# Patient Record
Sex: MALE | Race: WHITE | NOT HISPANIC OR LATINO | Employment: OTHER | URBAN - METROPOLITAN AREA
[De-identification: names, ages, dates, MRNs, and addresses within clinical notes are randomized per-mention and may not be internally consistent; named-entity substitution may affect disease eponyms.]

---

## 2018-02-26 RX ORDER — CYCLOBENZAPRINE HCL 10 MG
10 TABLET ORAL 3 TIMES DAILY PRN
COMMUNITY
End: 2022-12-02

## 2018-02-26 RX ORDER — METHOCARBAMOL 750 MG/1
750 TABLET, FILM COATED ORAL 4 TIMES DAILY PRN
COMMUNITY
End: 2022-12-02

## 2018-02-26 RX ORDER — GABAPENTIN 300 MG/1
300 CAPSULE ORAL 3 TIMES DAILY
COMMUNITY
End: 2021-06-07

## 2018-02-26 RX ORDER — SILDENAFIL CITRATE 20 MG/1
20 TABLET ORAL DAILY
COMMUNITY
End: 2022-12-02

## 2018-02-26 RX ORDER — LORATADINE 10 MG/1
10 TABLET ORAL DAILY
COMMUNITY

## 2018-02-26 RX ORDER — OFLOXACIN 3 MG/ML
1 SOLUTION/ DROPS OPHTHALMIC 4 TIMES DAILY
COMMUNITY
End: 2021-06-07

## 2018-02-26 RX ORDER — FLUTICASONE PROPIONATE 50 MCG
2 SPRAY, SUSPENSION (ML) NASAL DAILY
COMMUNITY
End: 2022-12-02

## 2018-02-26 RX ORDER — RANITIDINE 150 MG/1
150 TABLET ORAL 2 TIMES DAILY
COMMUNITY
End: 2021-06-07

## 2018-02-26 RX ORDER — DICLOFENAC SODIUM 75 MG/1
75 TABLET, DELAYED RELEASE ORAL 2 TIMES DAILY
COMMUNITY
End: 2021-06-07

## 2018-02-26 RX ORDER — MELOXICAM 15 MG/1
15 TABLET ORAL DAILY
COMMUNITY
End: 2022-12-02 | Stop reason: SDUPTHER

## 2018-02-26 RX ORDER — ASPIRIN 81 MG/1
81 TABLET ORAL DAILY
COMMUNITY
End: 2023-02-06

## 2018-02-27 ENCOUNTER — OFFICE VISIT (OUTPATIENT)
Dept: NEUROSURGERY | Facility: CLINIC | Age: 68
End: 2018-02-27

## 2018-02-27 VITALS
RESPIRATION RATE: 18 BRPM | TEMPERATURE: 96.8 F | HEIGHT: 73 IN | WEIGHT: 218 LBS | BODY MASS INDEX: 28.89 KG/M2 | OXYGEN SATURATION: 99 %

## 2018-02-27 DIAGNOSIS — M47.816 FACET ARTHRITIS OF LUMBAR REGION: ICD-10-CM

## 2018-02-27 DIAGNOSIS — M54.9 MECHANICAL BACK PAIN: Primary | ICD-10-CM

## 2018-02-27 DIAGNOSIS — M51.36 DEGENERATIVE DISC DISEASE, LUMBAR: ICD-10-CM

## 2018-02-27 DIAGNOSIS — M51.36 BULGING LUMBAR DISC: ICD-10-CM

## 2018-02-27 PROCEDURE — 99203 OFFICE O/P NEW LOW 30 MIN: CPT | Performed by: NEUROLOGICAL SURGERY

## 2018-02-27 NOTE — PROGRESS NOTES
Patient: Jackson Sheppard  : 1950    Primary Care Provider: Luis Mock MD    Requesting Provider: As above        History    Chief Complaint: Chronic low back pain.    History of Present Illness: The patient is a 67-year-old gentleman retired from the  who has had low back pain that's been present intermittently since at least .  His current spell began in September.  He's very physical.  He's been in the .  He has been an avid runner in the past.  He also used to workout at the gym extensively.  In September he was cutting wood and feels that that may have aggravated his symptoms.  Over the last couple of weeks his symptoms have largely dissipated.  He's been treated with medicines including meloxicam, Neurontin, and diclofenac.  He has no leg pain, weakness, or sensory alteration.  He reports no bowel or bladder dysfunction.  He still has some mild symptoms when changing position.    Review of Systems   Constitutional: Negative for activity change, appetite change, chills, diaphoresis, fatigue, fever and unexpected weight change.   HENT: Negative for congestion, dental problem, drooling, ear discharge, ear pain, facial swelling, hearing loss, mouth sores, nosebleeds, postnasal drip, rhinorrhea, sinus pressure, sneezing, sore throat, tinnitus, trouble swallowing and voice change.    Eyes: Negative for photophobia, pain, discharge, redness, itching and visual disturbance.   Respiratory: Negative for apnea, cough, choking, chest tightness, shortness of breath, wheezing and stridor.    Cardiovascular: Negative for chest pain, palpitations and leg swelling.   Gastrointestinal: Negative for abdominal distention, abdominal pain, anal bleeding, blood in stool, constipation, diarrhea, nausea, rectal pain and vomiting.   Endocrine: Negative for cold intolerance, heat intolerance, polydipsia, polyphagia and polyuria.   Genitourinary: Negative for decreased urine volume, difficulty  "urinating, dysuria, enuresis, flank pain, frequency, genital sores, hematuria and urgency.   Musculoskeletal: Positive for back pain. Negative for arthralgias, gait problem, joint swelling, myalgias, neck pain and neck stiffness.   Skin: Negative for color change, pallor, rash and wound.   Allergic/Immunologic: Negative for environmental allergies, food allergies and immunocompromised state.   Neurological: Negative for dizziness, tremors, seizures, syncope, facial asymmetry, speech difficulty, weakness, light-headedness, numbness and headaches.   Hematological: Negative for adenopathy. Does not bruise/bleed easily.   Psychiatric/Behavioral: Negative for agitation, behavioral problems, confusion, decreased concentration, dysphoric mood, hallucinations, self-injury, sleep disturbance and suicidal ideas. The patient is not nervous/anxious and is not hyperactive.    All other systems reviewed and are negative.      The patient's past medical history, past surgical history, family history, and social history have been reviewed at length in the electronic medical record.    Physical Exam:   Temp 96.8 °F (36 °C) (Temporal Artery )   Resp 18  Ht 185.4 cm (73\")  Wt 98.9 kg (218 lb)  SpO2 99%  BMI 28.76 kg/m2  CONSTITUTIONAL: Patient is well-nourished, pleasant and appears stated age.  CV: Heart regular rate and rhythm without murmur, rub, or gallop.  PULMONARY: Lungs are clear to ascultation.  MUSCULOSKELETAL:  Straight leg raising is negative.  Aleksandar's Sign is negative.  ROM in back is normal.  Tenderness in the back to palpation is not observed.  NEUROLOGICAL:  Orientation, memory, attention span, language function, and cognition have been examined and are intact.  Strength is intact in the lower extremities to direct testing.  Muscle tone is normal throughout.  Station and gait are normal.  Sensation is intact to light touch testing throughout.  Deep tendon reflexes are 1+ and symmetrical.  Coordination is " intact.      Medical Decision Making    Data Review:   MRI of the lumbar spine dated 2/6/18 demonstrates some diffuse degenerative disc disease.  There is some diffuse disc bulging.  There is facet arthropathy.  There is at least moderate stenosis at L4-5.    Diagnosis:   1.  Mechanical low back pain, improved.  2.  Lumbar degenerative disc disease.  3.  Lumbar degenerative joint disease.  4.  Lumbar stenosis without neurogenic claudication.    Treatment Options:   Mr. Sheppard is doing better.  Treatment should remain symptomatic.  I reassured him and will be available if he develops additional difficulties.       Diagnosis Plan   1. Mechanical back pain     2. Bulging lumbar disc     3. Degenerative disc disease, lumbar     4. Facet arthritis of lumbar region         Scribed for Leonard Fraser MD by Mavis Saha CMA on 02/27/2018 at 11:05 AM    I, Dr. Fraser, personally performed the services described in the documentation, as scribed in my presence, and it is both accurate and complete.

## 2021-01-05 ENCOUNTER — APPOINTMENT (OUTPATIENT)
Dept: PREADMISSION TESTING | Facility: HOSPITAL | Age: 71
End: 2021-01-05

## 2021-01-05 PROCEDURE — C9803 HOPD COVID-19 SPEC COLLECT: HCPCS

## 2021-01-05 PROCEDURE — U0004 COV-19 TEST NON-CDC HGH THRU: HCPCS

## 2021-01-06 LAB — SARS-COV-2 RNA RESP QL NAA+PROBE: NOT DETECTED

## 2021-06-07 ENCOUNTER — OFFICE VISIT (OUTPATIENT)
Dept: NEUROLOGY | Facility: CLINIC | Age: 71
End: 2021-06-07

## 2021-06-07 ENCOUNTER — TELEPHONE (OUTPATIENT)
Dept: NEUROLOGY | Facility: CLINIC | Age: 71
End: 2021-06-07

## 2021-06-07 ENCOUNTER — LAB (OUTPATIENT)
Dept: LAB | Facility: HOSPITAL | Age: 71
End: 2021-06-07

## 2021-06-07 VITALS
HEIGHT: 73 IN | DIASTOLIC BLOOD PRESSURE: 80 MMHG | SYSTOLIC BLOOD PRESSURE: 130 MMHG | OXYGEN SATURATION: 99 % | BODY MASS INDEX: 25.31 KG/M2 | HEART RATE: 67 BPM | WEIGHT: 191 LBS | TEMPERATURE: 97.9 F

## 2021-06-07 DIAGNOSIS — R25.1 TREMOR: ICD-10-CM

## 2021-06-07 DIAGNOSIS — R25.9 PARKINSONIAN FEATURES: Primary | ICD-10-CM

## 2021-06-07 LAB
ANION GAP SERPL CALCULATED.3IONS-SCNC: 6.9 MMOL/L (ref 5–15)
BUN SERPL-MCNC: 13 MG/DL (ref 8–23)
BUN/CREAT SERPL: 14.8 (ref 7–25)
CALCIUM SPEC-SCNC: 9.1 MG/DL (ref 8.6–10.5)
CERULOPLASMIN SERPL-MCNC: 24 MG/DL (ref 16–31)
CHLORIDE SERPL-SCNC: 103 MMOL/L (ref 98–107)
CO2 SERPL-SCNC: 28.1 MMOL/L (ref 22–29)
CREAT SERPL-MCNC: 0.88 MG/DL (ref 0.76–1.27)
GFR SERPL CREATININE-BSD FRML MDRD: 86 ML/MIN/1.73
GLUCOSE SERPL-MCNC: 88 MG/DL (ref 65–99)
POTASSIUM SERPL-SCNC: 4.3 MMOL/L (ref 3.5–5.2)
SODIUM SERPL-SCNC: 138 MMOL/L (ref 136–145)

## 2021-06-07 PROCEDURE — 82390 ASSAY OF CERULOPLASMIN: CPT

## 2021-06-07 PROCEDURE — 80048 BASIC METABOLIC PNL TOTAL CA: CPT

## 2021-06-07 PROCEDURE — 36415 COLL VENOUS BLD VENIPUNCTURE: CPT

## 2021-06-07 PROCEDURE — 99203 OFFICE O/P NEW LOW 30 MIN: CPT | Performed by: NURSE PRACTITIONER

## 2021-06-07 PROCEDURE — 82525 ASSAY OF COPPER: CPT

## 2021-06-07 RX ORDER — PRIMIDONE 50 MG/1
50 TABLET ORAL NIGHTLY
COMMUNITY
End: 2021-06-07

## 2021-06-07 RX ORDER — CARBIDOPA AND LEVODOPA 25; 100 MG/1; MG/1
1 TABLET, EXTENDED RELEASE ORAL 2 TIMES DAILY
COMMUNITY
Start: 2021-04-10 | End: 2021-06-07

## 2021-06-07 RX ORDER — ROPINIROLE 0.25 MG/1
0.25 TABLET, FILM COATED ORAL 3 TIMES DAILY
Qty: 90 TABLET | Refills: 1 | Status: SHIPPED | OUTPATIENT
Start: 2021-06-07 | End: 2021-08-04
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
## 2021-06-07 NOTE — TELEPHONE ENCOUNTER
Pharmacy Name:  [facility]    Pharmacy representative name: JAKE    Pharmacy representative phone number: [phone]    What medication are you calling in regards to: ROPINIROLE 0.25 MG    What question does the pharmacy have: THE PHARMACY WAS CALLING TODAY BECAUSE THEY ARE NEEDING TO CONFIRM THE DIRECTIONS FOR THE MEDICATION.    Who is the provider that prescribed the medication: TARA SCHROEDER    Additional notes: PLEASE GIVE THEM A CALL BACK TO DISCUSS THIS WITH THEM

## 2021-06-08 NOTE — TELEPHONE ENCOUNTER
You had me call the patient and tell them to start 1 nightly then gradually go up to 2 xdaily and then 3x daily, but then your RX directions say 1 hour before bedtime. Please clarify

## 2021-06-08 NOTE — TELEPHONE ENCOUNTER
PTS WIFE CALLED BACK TO SEE IF THE CLARIFICATION WAS CALLED IN YET. SHE STATES THEY CAN'T GET THE MEDICATION UNTIL IT'S CALLED IN.

## 2021-06-10 LAB — COPPER SERPL-MCNC: 103 UG/DL (ref 69–132)

## 2021-06-11 ENCOUNTER — TELEPHONE (OUTPATIENT)
Dept: NEUROLOGY | Facility: CLINIC | Age: 71
End: 2021-06-11

## 2021-06-11 NOTE — TELEPHONE ENCOUNTER
----- Message from SHERIE Schwarz sent at 6/11/2021  7:33 AM EDT -----  Please let patient know his copper and ceruloplasmin levels were in normal range.  His kidney electrolyte liver panel also stable.  Please fax a copy of labs to his primary care

## 2021-06-11 NOTE — PROGRESS NOTES
Please let patient know his copper and ceruloplasmin levels were in normal range.  His kidney electrolyte liver panel also stable.  Please fax a copy of labs to his primary care

## 2021-06-23 ENCOUNTER — TELEPHONE (OUTPATIENT)
Dept: NEUROLOGY | Facility: CLINIC | Age: 71
End: 2021-06-23

## 2021-06-23 NOTE — TELEPHONE ENCOUNTER
Patient has follow up appt 7/6/21 but MICHAEL Scan is not until 7/8/21 does he need to reschedule the follow up? Please advise

## 2021-06-23 NOTE — TELEPHONE ENCOUNTER
"Caller: Silver Jackson ANN    Relationship: Self    Best call back number: [phone]    What was the call regarding: PT CALLED BACK REGARDING PREVIOUS ENCOUNTER. I INFORMED PT THAT HE IS SCHEDULED FOR A F/U APPT WITH SHERIE MAZARIEGOS ON 7/6/21 FOR 4 WK F/U. I ALSO INFORMED PT THAT A REP FROM [facility] HAD REACHED OUT TO HIM AROUND 6PM ON 6/22/21 IN ATTEMPT TO SCHEDULE BUT WERE UNABLE TO LEAVE A VM. THIS INFORMATION WAS DOCUMENTED IN THE COMMUNICATIONS OF THE REFERRAL/ORDER. I DID ATTEMPT TO WARM TRANSFER PT TO [facility] TO SCHEDULE MICHAEL SCAN. SPOKE WITH ISAI GOMEZ WITH [facility] WHO STATED THAT THE CALL WAS MADE IN ERROR AND THEY ARE REJECTING THE ORDER/REF DUE TO  \"DIAGNOSES IS WRONG, CODING IS WRONG, EVERYTHING REGARDING THE ORDER IS WRONG\". I THEN INFORMED PT THAT [facility] HAD MADE AN ERROR IN CALLING HIM LAST EVENING AND THAT WE WERE GOING TO HAVE TO RESUBMIT ORDER, PER CTs REQUEST. PT'S SO WAS ON THE LINE AND THANKED ME FOR MY TIME; STATED PT WAS IN THE OTHER LINE WITH [facility] HIMSELF.      PLEASE REVIEW AND ADVISE.        "

## 2021-06-23 NOTE — TELEPHONE ENCOUNTER
Provider: TARA HUTCHINSON    Caller: PT    Relationship to Patient: SELF    Pharmacy: NA    Phone Number: [phone]    Reason for Call: PATIENT SAID HE WAS RETURNING A CALL TO [facility] ABOUT A SCAN THAT WAS SUPPOSED TO BE ON 7-6-21 AT 3:30. MESSAGE IN REFERRAL FOR DATSCAN. I TRANSFERRED PATIENT TO [facility] SCHEDUL[facility]. HE HAD MENTIONED GETTING SCAN AT . PLEASE ADVISE.       When was the patient last seen: 6-7-21

## 2021-06-23 NOTE — TELEPHONE ENCOUNTER
This is a MICHAEL scan and should have been outgoing in referrals. This is only done at . Spoke to patient and his wife and they are aware of appointment and information for test. I am also mailing them the information with address and instructions.

## 2021-06-29 NOTE — TELEPHONE ENCOUNTER
LM 3 times to let patient know to reschedule the appt on 7/6/21. He does not need to come in until after the [facility] Scan that he is having at  on 7/8/21.

## 2021-07-19 ENCOUNTER — TELEPHONE (OUTPATIENT)
Dept: NEUROLOGY | Facility: CLINIC | Age: 71
End: 2021-07-19

## 2021-07-19 NOTE — TELEPHONE ENCOUNTER
Caller: Norma Sheppard    Relationship: Emergency Contact    Best call back number: [phone]    Caller requesting test results: YES     What test was performed:NM BRAIN DATSCAN     When was the test performed: 06.08.21  Where was the test performed:[facility].     Additional notes: PT WIFE HAS CALLED REQUESTING TEST RESULTS ON SKIP     PLEASE ADVISE

## 2021-07-20 NOTE — TELEPHONE ENCOUNTER
SKIP'S WIFE CALLED WANTING RESULTS ON DATS SCAN  PLEASE CALL AND ADVISE    # [phone]         OR  # [phone]

## 2021-07-21 NOTE — TELEPHONE ENCOUNTER
PATIENT AND WIFE GIVEN RESULTS. THEY SAID THAT THE ROPINIROLE HAS NOT HELPED AND ACTUALLY HAS MADE THE TREMORS WORSE. HE HAS TRIED PRIMIDONE AND CARBIDOPA LEVADOPA AND THEY DID NOT HELP EITHER. WHAT ELSE CAN THEY DO. PLEASE ADVISE

## 2021-07-21 NOTE — TELEPHONE ENCOUNTER
Please let patient know his DaTscan was consistent with a parkinsonian syndrome/Parkinson's disease.  How is he doing with the ropinirole?

## 2021-07-21 NOTE — TELEPHONE ENCOUNTER
If they requip is making him worse then stop the medication. Remain off for a couple weeks and then we can trial another.  I can also put in  referral to [facility] as well if they would like

## 2021-07-22 NOTE — TELEPHONE ENCOUNTER
Spoke with Jackson and his wife. They will stop the Requip and call back in a couple of weeks to let us know how he is doing. Then the new medication can be called in. They would like to have the referral to the [facility].

## 2021-07-23 DIAGNOSIS — R25.9 PARKINSONIAN FEATURES: ICD-10-CM

## 2021-07-23 DIAGNOSIS — G25.2 RESTING TREMOR: Primary | ICD-10-CM

## 2021-07-26 ENCOUNTER — TELEPHONE (OUTPATIENT)
Dept: NEUROLOGY | Facility: CLINIC | Age: 71
End: 2021-07-26

## 2021-08-04 ENCOUNTER — TELEPHONE (OUTPATIENT)
Dept: NEUROLOGY | Facility: CLINIC | Age: 71
End: 2021-08-04

## 2021-08-04 DIAGNOSIS — G20 PARKINSON'S DISEASE (HCC): Primary | ICD-10-CM

## 2021-08-04 DIAGNOSIS — G25.2 RESTING TREMOR: ICD-10-CM

## 2021-08-04 DIAGNOSIS — G20 PARKINSON'S SYNDROME (HCC): ICD-10-CM

## 2021-08-04 RX ORDER — PRAMIPEXOLE DIHYDROCHLORIDE 0.5 MG/1
TABLET ORAL
Qty: 90 TABLET | Refills: 2 | Status: SHIPPED | OUTPATIENT
Start: 2021-08-04 | End: 2022-12-02

## 2021-08-04 NOTE — TELEPHONE ENCOUNTER
"Wife called back today to report how hugo is doing. He has been off the Requip for 2 weeks now. They had said that the requip seemed to make him worse. It did not help his tremors at all. She says he needs something. He is having mood issues, tremors and also now his speech is \" trembly.\" He has been on primidone and Carbidopa- levodopa and neither of them have helped him. She would like something sent in for him. Pharmacy verified.  "

## 2021-08-04 NOTE — TELEPHONE ENCOUNTER
I would have them completely discontinue the ropinirole as they have already done.  SHERIE Hernadez has already referred them to [facility].  I will add pramipexole which is Mirapex it is similar to ropinirole but sometimes can be tolerated better and at 0.5 mg he will take 1 pill at night for 1 week and then 1 pill twice a day for a week and then 1 pill 3 times a day with meals.  Needs to follow-up with SHERIE Hernadez as scheduled on 9/22/2021.  Thanks, SHERIE Khanna.

## 2021-08-13 ENCOUNTER — TELEPHONE (OUTPATIENT)
Dept: NEUROLOGY | Facility: CLINIC | Age: 71
End: 2021-08-13

## 2021-08-13 NOTE — TELEPHONE ENCOUNTER
Caller: Norma Sheppard    Relationship: Emergency Contact; [employer]    Best call back number: [phone]    What form or medical record are you requesting: OV NOTES 6/7/21 & 7/6/21    Who is requesting this form or medical record from you: PT'S WIFE    How would you like to receive the form or medical records (pick-up, mail, fax): FAX TO [facility] AND [facility]    [facility]P FAX: [phone]  [facility] FAX: [phone] ATTN: LINDSEYONE    Timeframe paperwork needed: EARLIEST CONVENIENCE    PLEASE REVIEW AND ADVISE.

## 2022-12-02 ENCOUNTER — OFFICE VISIT (OUTPATIENT)
Dept: FAMILY MEDICINE CLINIC | Facility: CLINIC | Age: 72
End: 2022-12-02

## 2022-12-02 VITALS
HEIGHT: 73 IN | DIASTOLIC BLOOD PRESSURE: 90 MMHG | OXYGEN SATURATION: 98 % | HEART RATE: 65 BPM | BODY MASS INDEX: 23.33 KG/M2 | SYSTOLIC BLOOD PRESSURE: 138 MMHG | WEIGHT: 176 LBS

## 2022-12-02 DIAGNOSIS — G20 MODERATE DEMENTIA DUE TO PARKINSON'S DISEASE, WITHOUT BEHAVIORAL DISTURBANCE, PSYCHOTIC DISTURBANCE, MOOD DISTURBANCE, OR ANXIETY: ICD-10-CM

## 2022-12-02 DIAGNOSIS — G20 PARKINSON'S DISEASE: ICD-10-CM

## 2022-12-02 DIAGNOSIS — Z12.5 PROSTATE CANCER SCREENING: ICD-10-CM

## 2022-12-02 DIAGNOSIS — F02.B0 MODERATE DEMENTIA DUE TO PARKINSON'S DISEASE, WITHOUT BEHAVIORAL DISTURBANCE, PSYCHOTIC DISTURBANCE, MOOD DISTURBANCE, OR ANXIETY: ICD-10-CM

## 2022-12-02 DIAGNOSIS — Z00.00 ROUTINE GENERAL MEDICAL EXAMINATION AT A HEALTH CARE FACILITY: Primary | ICD-10-CM

## 2022-12-02 PROCEDURE — 90662 IIV NO PRSV INCREASED AG IM: CPT | Performed by: FAMILY MEDICINE

## 2022-12-02 PROCEDURE — 99397 PER PM REEVAL EST PAT 65+ YR: CPT | Performed by: FAMILY MEDICINE

## 2022-12-02 PROCEDURE — G0008 ADMIN INFLUENZA VIRUS VAC: HCPCS | Performed by: FAMILY MEDICINE

## 2022-12-02 PROCEDURE — 90677 PCV20 VACCINE IM: CPT | Performed by: FAMILY MEDICINE

## 2022-12-02 PROCEDURE — G0009 ADMIN PNEUMOCOCCAL VACCINE: HCPCS | Performed by: FAMILY MEDICINE

## 2022-12-02 RX ORDER — MELOXICAM 15 MG/1
15 TABLET ORAL DAILY
Qty: 90 TABLET | Refills: 1 | Status: SHIPPED | OUTPATIENT
Start: 2022-12-02

## 2022-12-02 RX ORDER — RIVASTIGMINE 4.6 MG/24H
1 PATCH, EXTENDED RELEASE TRANSDERMAL DAILY
Start: 2022-12-02 | End: 2023-02-06

## 2022-12-05 PROBLEM — G20 PARKINSON'S DISEASE (HCC): Status: ACTIVE | Noted: 2021-09-17

## 2022-12-05 PROBLEM — G20.A1 PARKINSON'S DISEASE: Status: ACTIVE | Noted: 2021-09-17

## 2022-12-05 NOTE — PROGRESS NOTES
Follow Up Office Visit      Date of Visit:  2022   Patient Name: Jackson Sheppard  : 1950   MRN: [id number]     Chief Complaint:    Chief Complaint   Patient presents with   • check up       History of Present Illness: Jackson Sheppard is a 72 y.o. male who is here today for follow up.  Health maintenance discussed today.  Refilled appropriate medications.  Discussed his dementia and Parkinson's.  Overall there has been a great deal of worsening since our last visit.        Subjective      Review of Systems:   Review of Systems   Constitutional: Negative for fatigue and fever.   HENT: Negative for congestion and ear pain.    Respiratory: Negative for apnea, cough, chest tightness and shortness of breath.    Cardiovascular: Negative for chest pain.   Gastrointestinal: Negative for abdominal pain, constipation, diarrhea and nausea.   Musculoskeletal: Negative for arthralgias.   Neurological: Positive for dizziness, tremors, weakness and confusion.   Psychiatric/Behavioral: Negative for depressed mood and stress.       Past Medical History:   Past Medical History:   Diagnosis Date   • Chronic low back pain    • CTS (carpal tunnel syndrome)    • Erectile dysfunction of organic origin    • Essential hypertension    • Hearing loss    • High risk medication use     DRUG THERAPY FINDING   • Nasal congestion    • Other disturbances of smell and taste    • Tremor        Past Surgical History:   Past Surgical History:   Procedure Laterality Date   • CHOLECYSTECTOMY         Family History:   Family History   Problem Relation Age of Onset   • Diabetes Mother    • Hyperlipidemia Mother    • Coronary artery disease Mother    • Cancer Father    • Alzheimer's disease Brother    • Hyperlipidemia Brother    • Diabetes Brother    • Hypertension Brother        Social History:   Social History     Socioeconomic History   • Marital status:    Tobacco Use   • Smoking status: Never   • Smokeless tobacco: Never  "  Vaping Use   • Vaping Use: Never used   Substance and Sexual Activity   • Alcohol use: No   • Drug use: No   • Sexual activity: Yes     Partners: Female       Medications:     Current Outpatient Medications:   •  meloxicam (MOBIC) 15 MG tablet, Take 1 tablet by mouth Daily., Disp: 90 tablet, Rfl: 1  •  aspirin 81 MG EC tablet, Take 81 mg by mouth Daily., Disp: , Rfl:   •  loratadine (CLARITIN) 10 MG tablet, Take 10 mg by mouth Daily., Disp: , Rfl:   •  rivastigmine (EXELON) 4.6 MG/24HR patch, Place 1 patch on the skin as directed by provider Daily., Disp: , Rfl:     Allergies:   No Known Allergies    Objective     Physical Exam:  Vital Signs:   Vitals:    12/02/22 1428   BP: 138/90   Pulse: 65   SpO2: 98%   Weight: 79.8 kg (176 lb)   Height: 185.4 cm (73\")     Body mass index is 23.22 kg/m².     Physical Exam  Vitals and nursing note reviewed.   Constitutional:       General: He is not in acute distress.     Appearance: Normal appearance. He is not ill-appearing.   HENT:      Head: Normocephalic and atraumatic.      Right Ear: Tympanic membrane and ear canal normal.      Left Ear: Tympanic membrane and ear canal normal.      Nose: Nose normal.   Cardiovascular:      Rate and Rhythm: Normal rate and regular rhythm.      Heart sounds: Normal heart sounds.   Pulmonary:      Effort: Pulmonary effort is normal.      Breath sounds: Normal breath sounds.   Neurological:      Mental Status: He is alert and oriented to person, place, and time. Mental status is at baseline.      Motor: Weakness present.      Gait: Gait abnormal.         Procedures      Assessment / Plan      Assessment/Plan:   Diagnoses and all orders for this visit:    1. Routine general medical examination at a health care facility (Primary)  -     CBC Auto Differential; Future  -     Comprehensive Metabolic Panel; Future  -     Lipid Panel; Future  -     PSA Screen; Future  -     TSH; Future    2. Prostate cancer screening  -     PSA Screen; Future    3. " Parkinson's disease (HCC)    4. Moderate dementia due to Parkinson's disease, without behavioral disturbance, psychotic disturbance, mood disturbance, or anxiety (HCC)    Other orders  -     Fluzone High-Dose 65+yrs (6741-5571)  -     Pneumococcal Conjugate Vaccine 20-Valent (PCV20)  -     meloxicam (MOBIC) 15 MG tablet; Take 1 tablet by mouth Daily.  Dispense: 90 tablet; Refill: 1  -     rivastigmine (EXELON) 4.6 MG/24HR patch; Place 1 patch on the skin as directed by provider Daily.         Completed checkup today.  Appropriate health maintenance discussed.  Continue to follow-up with neurology.  Parkinson's and dementia have worsened a great deal since our last visit.  Refilled appropriate medications.    Follow Up:   No follow-ups on file.    Luis Mock  [facility] [place] [facility] [place]

## 2022-12-14 ENCOUNTER — LAB (OUTPATIENT)
Dept: FAMILY MEDICINE CLINIC | Facility: CLINIC | Age: 72
End: 2022-12-14

## 2022-12-14 DIAGNOSIS — Z00.00 ROUTINE GENERAL MEDICAL EXAMINATION AT A HEALTH CARE FACILITY: ICD-10-CM

## 2022-12-14 DIAGNOSIS — Z12.5 PROSTATE CANCER SCREENING: ICD-10-CM

## 2022-12-14 PROCEDURE — 36415 COLL VENOUS BLD VENIPUNCTURE: CPT | Performed by: FAMILY MEDICINE

## 2022-12-15 LAB
ALBUMIN SERPL-MCNC: 4 G/DL (ref 3.8–4.8)
ALBUMIN/GLOB SERPL: 1.7 {RATIO} (ref 1.2–2.2)
ALP SERPL-CCNC: 77 IU/L (ref 44–121)
ALT SERPL-CCNC: 9 IU/L (ref 0–44)
AST SERPL-CCNC: 17 IU/L (ref 0–40)
BASOPHILS # BLD AUTO: 0 X10E3/UL (ref 0–0.2)
BASOPHILS NFR BLD AUTO: 1 %
BILIRUB SERPL-MCNC: 0.7 MG/DL (ref 0–1.2)
BUN SERPL-MCNC: 16 MG/DL (ref 8–27)
BUN/CREAT SERPL: 18 (ref 10–24)
CALCIUM SERPL-MCNC: 9 MG/DL (ref 8.6–10.2)
CHLORIDE SERPL-SCNC: 101 MMOL/L (ref 96–106)
CHOLEST SERPL-MCNC: 181 MG/DL (ref 100–199)
CO2 SERPL-SCNC: 24 MMOL/L (ref 20–29)
CREAT SERPL-MCNC: 0.87 MG/DL (ref 0.76–1.27)
EGFRCR SERPLBLD CKD-EPI 2021: 93 ML/MIN/1.73
EOSINOPHIL # BLD AUTO: 0 X10E3/UL (ref 0–0.4)
EOSINOPHIL NFR BLD AUTO: 0 %
ERYTHROCYTE [DISTWIDTH] IN BLOOD BY AUTOMATED COUNT: 11.8 % (ref 11.6–15.4)
GLOBULIN SER CALC-MCNC: 2.4 G/DL (ref 1.5–4.5)
GLUCOSE SERPL-MCNC: 87 MG/DL (ref 70–99)
HCT VFR BLD AUTO: 41.1 % (ref 37.5–51)
HDLC SERPL-MCNC: 53 MG/DL
HGB BLD-MCNC: 13.9 G/DL (ref 13–17.7)
IMM GRANULOCYTES # BLD AUTO: 0 X10E3/UL (ref 0–0.1)
IMM GRANULOCYTES NFR BLD AUTO: 0 %
LDLC SERPL CALC-MCNC: 116 MG/DL (ref 0–99)
LYMPHOCYTES # BLD AUTO: 1.9 X10E3/UL (ref 0.7–3.1)
LYMPHOCYTES NFR BLD AUTO: 29 %
MCH RBC QN AUTO: 30.7 PG (ref 26.6–33)
MCHC RBC AUTO-ENTMCNC: 33.8 G/DL (ref 31.5–35.7)
MCV RBC AUTO: 91 FL (ref 79–97)
MONOCYTES # BLD AUTO: 0.6 X10E3/UL (ref 0.1–0.9)
MONOCYTES NFR BLD AUTO: 10 %
NEUTROPHILS # BLD AUTO: 3.8 X10E3/UL (ref 1.4–7)
NEUTROPHILS NFR BLD AUTO: 60 %
PLATELET # BLD AUTO: 215 X10E3/UL (ref 150–450)
POTASSIUM SERPL-SCNC: 4.2 MMOL/L (ref 3.5–5.2)
PROT SERPL-MCNC: 6.4 G/DL (ref 6–8.5)
PSA SERPL-MCNC: 0.6 NG/ML (ref 0–4)
RBC # BLD AUTO: 4.53 X10E6/UL (ref 4.14–5.8)
SODIUM SERPL-SCNC: 140 MMOL/L (ref 134–144)
TRIGL SERPL-MCNC: 61 MG/DL (ref 0–149)
TSH SERPL DL<=0.005 MIU/L-ACNC: 1.76 UIU/ML (ref 0.45–4.5)
VLDLC SERPL CALC-MCNC: 12 MG/DL (ref 5–40)
WBC # BLD AUTO: 6.3 X10E3/UL (ref 3.4–10.8)

## 2023-02-06 ENCOUNTER — OFFICE VISIT (OUTPATIENT)
Dept: FAMILY MEDICINE CLINIC | Facility: CLINIC | Age: 73
End: 2023-02-06
Payer: MEDICARE

## 2023-02-06 VITALS
SYSTOLIC BLOOD PRESSURE: 124 MMHG | HEART RATE: 65 BPM | WEIGHT: 170.6 LBS | HEIGHT: 72 IN | DIASTOLIC BLOOD PRESSURE: 78 MMHG | OXYGEN SATURATION: 96 % | BODY MASS INDEX: 23.11 KG/M2

## 2023-02-06 DIAGNOSIS — G20 PARKINSON'S DISEASE: ICD-10-CM

## 2023-02-06 DIAGNOSIS — K64.9 HEMORRHOIDS, UNSPECIFIED HEMORRHOID TYPE: Primary | ICD-10-CM

## 2023-02-06 PROCEDURE — 99213 OFFICE O/P EST LOW 20 MIN: CPT | Performed by: NURSE PRACTITIONER

## 2023-02-06 RX ORDER — HYDROCORTISONE ACETATE 25 MG/1
25 SUPPOSITORY RECTAL 2 TIMES DAILY
Qty: 28 SUPPOSITORY | Refills: 0 | Status: SHIPPED | OUTPATIENT
Start: 2023-02-06

## 2023-02-06 RX ORDER — RIVASTIGMINE 9.5 MG/24H
1 PATCH, EXTENDED RELEASE TRANSDERMAL DAILY
COMMUNITY

## 2023-02-06 RX ORDER — CARBIDOPA AND LEVODOPA 25; 100 MG/1; MG/1
1 TABLET, EXTENDED RELEASE ORAL 3 TIMES DAILY
COMMUNITY

## 2023-02-06 NOTE — ASSESSMENT & PLAN NOTE
Anusol suppository as prescribed as needed.  Water encouraged.  Risk of meds discussed and understood.  May use daily Metamucil.  Increase fiber intake.  MiraLAX as needed if feels getting constipated.  Avoid straining.  Proper diet and exercise plan discussed and encouraged.  Return in 5 days if no improvement, sooner if worsens.  Return to clinic or ED with any issues or concerns.

## 2023-02-06 NOTE — ASSESSMENT & PLAN NOTE
We will put in an order for speech therapy.  Keep follow-up appointment with neurologist as scheduled.  Return to clinic or ED with any issues or concerns.

## 2023-02-10 ENCOUNTER — TRANSCRIBE ORDERS (OUTPATIENT)
Dept: HOME HEALTH SERVICES | Facility: HOME HEALTHCARE | Age: 73
End: 2023-02-10
Payer: MEDICARE

## 2023-02-10 ENCOUNTER — HOME HEALTH ADMISSION (OUTPATIENT)
Dept: HOME HEALTH SERVICES | Facility: HOME HEALTHCARE | Age: 73
End: 2023-02-10
Payer: MEDICARE

## 2023-02-10 DIAGNOSIS — G20 PARKINSON DISEASE: Primary | ICD-10-CM

## 2023-05-11 ENCOUNTER — OFFICE VISIT (OUTPATIENT)
Dept: FAMILY MEDICINE CLINIC | Facility: CLINIC | Age: 73
End: 2023-05-11
Payer: MEDICARE

## 2023-05-11 VITALS
HEART RATE: 61 BPM | HEIGHT: 72 IN | BODY MASS INDEX: 22.89 KG/M2 | SYSTOLIC BLOOD PRESSURE: 150 MMHG | DIASTOLIC BLOOD PRESSURE: 90 MMHG | WEIGHT: 169 LBS | OXYGEN SATURATION: 97 %

## 2023-05-11 DIAGNOSIS — G20 MODERATE DEMENTIA DUE TO PARKINSON'S DISEASE, WITHOUT BEHAVIORAL DISTURBANCE, PSYCHOTIC DISTURBANCE, MOOD DISTURBANCE, OR ANXIETY: ICD-10-CM

## 2023-05-11 DIAGNOSIS — G20 PARKINSON'S DISEASE: Primary | ICD-10-CM

## 2023-05-11 DIAGNOSIS — M47.9 OSTEOARTHRITIS OF LOWER BACK: ICD-10-CM

## 2023-05-11 DIAGNOSIS — F02.B0 MODERATE DEMENTIA DUE TO PARKINSON'S DISEASE, WITHOUT BEHAVIORAL DISTURBANCE, PSYCHOTIC DISTURBANCE, MOOD DISTURBANCE, OR ANXIETY: ICD-10-CM

## 2023-05-11 PROCEDURE — 99214 OFFICE O/P EST MOD 30 MIN: CPT | Performed by: FAMILY MEDICINE

## 2023-05-11 RX ORDER — BUSPIRONE HYDROCHLORIDE 10 MG/1
10 TABLET ORAL 2 TIMES DAILY
COMMUNITY

## 2023-05-11 RX ORDER — CARBIDOPA AND LEVODOPA 25; 100 MG/1; MG/1
2 TABLET, EXTENDED RELEASE ORAL 2 TIMES DAILY
Start: 2023-05-11

## 2023-05-11 RX ORDER — DONEPEZIL HYDROCHLORIDE 10 MG/1
TABLET, FILM COATED ORAL
COMMUNITY
Start: 2023-05-05

## 2023-05-11 RX ORDER — MELOXICAM 15 MG/1
15 TABLET ORAL DAILY
Qty: 90 TABLET | Refills: 1 | Status: SHIPPED | OUTPATIENT
Start: 2023-05-11

## 2023-05-11 NOTE — PROGRESS NOTES
Follow Up Office Visit      Date of Visit:  2023   Patient Name: Jackson Sheppard  : 1950   MRN: [id number]     Chief Complaint:    Chief Complaint   Patient presents with   • Tremors       History of Present Illness: Jackson Sheppard is a 72 y.o. male who is here today for follow up.  Patient here for follow-up on his Parkinson's and dementia.  Medication adjustments reviewed.  Not seeing a great deal of improvement and actually worsening mostly of his current symptoms.        Subjective      Review of Systems:   Review of Systems   Constitutional: Negative for fatigue and fever.   HENT: Negative for congestion and ear pain.    Respiratory: Negative for apnea, cough, chest tightness and shortness of breath.    Cardiovascular: Negative for chest pain.   Gastrointestinal: Negative for abdominal pain, constipation, diarrhea and nausea.   Musculoskeletal: Negative for arthralgias.   Neurological: Positive for tremors, speech difficulty, weakness and confusion.   Psychiatric/Behavioral: Negative for depressed mood and stress.       Past Medical History:   Past Medical History:   Diagnosis Date   • Chronic low back pain    • CTS (carpal tunnel syndrome)    • Erectile dysfunction of organic origin    • Essential hypertension    • Hearing loss    • High risk medication use     DRUG THERAPY FINDING   • Nasal congestion    • Other disturbances of smell and taste    • Tremor        Past Surgical History:   Past Surgical History:   Procedure Laterality Date   • CHOLECYSTECTOMY         Family History:   Family History   Problem Relation Age of Onset   • Diabetes Mother    • Hyperlipidemia Mother    • Coronary artery disease Mother    • Cancer Father    • Alzheimer's disease Brother    • Hyperlipidemia Brother    • Diabetes Brother    • Hypertension Brother        Social History:   Social History     Socioeconomic History   • Marital status:    Tobacco Use   • Smoking status: Never   • Smokeless  "tobacco: Never   Vaping Use   • Vaping Use: Never used   Substance and Sexual Activity   • Alcohol use: No   • Drug use: No   • Sexual activity: Yes     Partners: Female       Medications:     Current Outpatient Medications:   •  busPIRone (BUSPAR) 10 MG tablet, Take 1 tablet by mouth 2 (Two) Times a Day., Disp: , Rfl:   •  carbidopa-levodopa ER (SINEMET CR)  MG per tablet, Take 2 tablets by mouth 2 (Two) Times a Day., Disp: , Rfl:   •  donepezil (ARICEPT) 10 MG tablet, , Disp: , Rfl:   •  hydrocortisone (ANUSOL-HC) 25 MG suppository, Insert 1 suppository into the rectum 2 (Two) Times a Day., Disp: 28 suppository, Rfl: 0  •  loratadine (CLARITIN) 10 MG tablet, Take 1 tablet by mouth Daily., Disp: , Rfl:   •  meloxicam (MOBIC) 15 MG tablet, Take 1 tablet by mouth Daily., Disp: 90 tablet, Rfl: 1    Allergies:   No Known Allergies    Objective     Physical Exam:  Vital Signs:   Vitals:    05/11/23 0929   BP: 150/90   Pulse: 61   SpO2: 97%   Weight: 76.7 kg (169 lb)   Height: 182.9 cm (72\")     Body mass index is 22.92 kg/m².     Physical Exam  Vitals and nursing note reviewed.   Constitutional:       General: He is not in acute distress.     Appearance: Normal appearance. He is not ill-appearing.   HENT:      Head: Normocephalic and atraumatic.      Right Ear: Tympanic membrane and ear canal normal.      Left Ear: Tympanic membrane and ear canal normal.      Nose: Nose normal.   Cardiovascular:      Rate and Rhythm: Normal rate and regular rhythm.      Heart sounds: Normal heart sounds.   Pulmonary:      Effort: Pulmonary effort is normal.      Breath sounds: Normal breath sounds.   Neurological:      Mental Status: He is alert and oriented to person, place, and time. Mental status is at baseline.      Coordination: Coordination abnormal.      Gait: Gait abnormal.      Comments: Severe tremor.  Decreased ability to speak   Psychiatric:         Mood and Affect: Mood normal.         Procedures      Assessment / " Plan      Assessment/Plan:   Diagnoses and all orders for this visit:    1. Parkinson's disease (Primary)    2. Moderate dementia due to Parkinson's disease, without behavioral disturbance, psychotic disturbance, mood disturbance, or anxiety    3. Osteoarthritis of lower back    Other orders  -     carbidopa-levodopa ER (SINEMET CR)  MG per tablet; Take 2 tablets by mouth 2 (Two) Times a Day.  -     meloxicam (MOBIC) 15 MG tablet; Take 1 tablet by mouth Daily.  Dispense: 90 tablet; Refill: 1         They have not seen a great deal of difference with the deep brain stimulator or medication.  Some memory issues and cognition issues as well.  We discussed the possibility of referral for second opinion elsewhere they will happily consider and get back to us over the next few days.  Unsure what would be the next best steps.    Follow Up:   No follow-ups on file.    Luis Mock  [facility] [place] [facility] [place]

## 2023-05-15 ENCOUNTER — TELEPHONE (OUTPATIENT)
Dept: FAMILY MEDICINE CLINIC | Facility: CLINIC | Age: 73
End: 2023-05-15
Payer: MEDICARE

## 2023-05-15 DIAGNOSIS — G20 PARKINSON'S DISEASE: Primary | ICD-10-CM

## 2023-05-15 NOTE — TELEPHONE ENCOUNTER
DELETE AFTER REVIEWING: Telephone encounter to be sent to the clinical pool     Caller: Norma Sheppard    Relationship: Emergency Contact    Best call back number: [phone]    What is the medical concern/diagnosis: MENTAL HEALTH    What specialty or service is being requested: NEUROLOGY    What is the provider, practice or medical service name: [facility]    What is the office location: [place]    What is the office phone number: UNKNOWN    Any additional details: PATIENT WOULD LIKE TO GET REFERRAL FOR CONSULT AT [facility]

## 2024-08-02 ENCOUNTER — OFFICE VISIT (OUTPATIENT)
Dept: FAMILY MEDICINE CLINIC | Facility: CLINIC | Age: 74
End: 2024-08-02
Payer: MEDICARE

## 2024-08-02 VITALS
DIASTOLIC BLOOD PRESSURE: 76 MMHG | OXYGEN SATURATION: 97 % | SYSTOLIC BLOOD PRESSURE: 130 MMHG | HEART RATE: 57 BPM | HEIGHT: 72 IN | BODY MASS INDEX: 22.92 KG/M2

## 2024-08-02 DIAGNOSIS — G20.A1 PARKINSON'S DISEASE WITHOUT DYSKINESIA, UNSPECIFIED WHETHER MANIFESTATIONS FLUCTUATE: ICD-10-CM

## 2024-08-02 DIAGNOSIS — F02.C0 SEVERE ALZHEIMER'S DEMENTIA, UNSPECIFIED TIMING OF DEMENTIA ONSET, UNSPECIFIED WHETHER BEHAVIORAL, PSYCHOTIC, OR MOOD DISTURBANCE OR ANXIETY: Primary | ICD-10-CM

## 2024-08-02 DIAGNOSIS — G30.9 SEVERE ALZHEIMER'S DEMENTIA, UNSPECIFIED TIMING OF DEMENTIA ONSET, UNSPECIFIED WHETHER BEHAVIORAL, PSYCHOTIC, OR MOOD DISTURBANCE OR ANXIETY: Primary | ICD-10-CM

## 2024-08-02 PROCEDURE — 99214 OFFICE O/P EST MOD 30 MIN: CPT | Performed by: FAMILY MEDICINE

## 2024-08-02 RX ORDER — MEMANTINE HYDROCHLORIDE 10 MG/1
10 TABLET ORAL
COMMUNITY
Start: 2024-03-06

## 2024-08-02 NOTE — PROGRESS NOTES
Follow Up Office Visit      Date of Visit:  2024   Patient Name: Jackson Sheppard  : 1950   MRN: [id number]     Chief Complaint:    Chief Complaint   Patient presents with    Tremors       History of Present Illness: Jackson Sheppard is a 74 y.o. male who is here today for follow up.    History of Present Illness  The patient is a 74-year-old gentleman here for a complete checkup. He is accompanied by his wife.    The patient's tremors have worsened, and his cognitive function has not improved. He requires assistance with dressings and his wife is unable to leave him alone. He has not driven in 2 years. A caregiver visits the house twice weekly to assist with daily activities. [facility]. 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 He also suffers from speech difficulties and uses hearing aids. The memantine prescribed at the [facility] has significantly improved his mood. The patient has  lost weight, dropping from 150 to 168.  His gait is notably slowed, but he does not shuffle. He is able to leave the home with assistance, but does not require canes, braces, crutches, or assistive devices. His speech is impaired, but he comprehends what they are saying.      Subjective      Review of Systems:   Review of Systems   Constitutional:  Negative for fatigue and fever.   HENT:  Negative for congestion and ear pain.    Respiratory:  Negative for apnea, cough, chest tightness and shortness of breath.    Cardiovascular:  Negative for chest pain.   Gastrointestinal:  Negative for abdominal pain, constipation, diarrhea and nausea.   Musculoskeletal:  Negative for arthralgias.   Neurological:  Positive for tremors, speech difficulty, weakness and confusion.   Psychiatric/Behavioral:  Negative for depressed mood and stress.        Past Medical History:   Past Medical  "History:   Diagnosis Date    Chronic low back pain     CTS (carpal tunnel syndrome)     Erectile dysfunction of organic origin     Essential hypertension     Hearing loss     High risk medication use     DRUG THERAPY FINDING    Nasal congestion     Other disturbances of smell and taste     Tremor        Past Surgical History:   Past Surgical History:   Procedure Laterality Date    CHOLECYSTECTOMY         Family History:   Family History   Problem Relation Age of Onset    Diabetes Mother     Hyperlipidemia Mother     Coronary artery disease Mother     Cancer Father     Alzheimer's disease Brother     Hyperlipidemia Brother     Diabetes Brother     Hypertension Brother        Social History:   Social History     Socioeconomic History    Marital status:    Tobacco Use    Smoking status: Never    Smokeless tobacco: Never   Vaping Use    Vaping status: Never Used   Substance and Sexual Activity    Alcohol use: No    Drug use: No    Sexual activity: Yes     Partners: Female       Medications:     Current Outpatient Medications:     busPIRone (BUSPAR) 10 MG tablet, Take 1 tablet by mouth 2 (Two) Times a Day., Disp: , Rfl:     carbidopa-levodopa ER (SINEMET CR)  MG per tablet, Take 2 tablets by mouth 2 (Two) Times a Day., Disp: , Rfl:     donepezil (ARICEPT) 10 MG tablet, , Disp: , Rfl:     hydrocortisone (ANUSOL-HC) 25 MG suppository, Insert 1 suppository into the rectum 2 (Two) Times a Day., Disp: 28 suppository, Rfl: 0    loratadine (CLARITIN) 10 MG tablet, Take 1 tablet by mouth Daily., Disp: , Rfl:     memantine (NAMENDA) 10 MG tablet, 1 tablet., Disp: , Rfl:     meloxicam (MOBIC) 15 MG tablet, Take 1 tablet by mouth Daily. (Patient not taking: Reported on 8/2/2024), Disp: 90 tablet, Rfl: 1    Allergies:   No Known Allergies    Objective     Physical Exam:  Vital Signs:   Vitals:    08/02/24 1108   BP: 130/76   Pulse: 57   SpO2: 97%   Height: 182.9 cm (72\")     Body mass index is 22.92 kg/m².     Physical " Exam  Vitals and nursing note reviewed.   Constitutional:       General: He is not in acute distress.     Appearance: Normal appearance. He is not ill-appearing.   HENT:      Head: Normocephalic and atraumatic.      Right Ear: Tympanic membrane and ear canal normal.      Left Ear: Tympanic membrane and ear canal normal.      Nose: Nose normal.   Cardiovascular:      Rate and Rhythm: Normal rate and regular rhythm.      Heart sounds: Normal heart sounds.   Pulmonary:      Effort: Pulmonary effort is normal.      Breath sounds: Normal breath sounds.   Neurological:      Mental Status: He is alert. Mental status is at baseline. He is disoriented.      Coordination: Coordination abnormal.      Gait: Gait abnormal.   Psychiatric:         Mood and Affect: Mood normal.       Physical Exam  Vital Signs  Weight is 169. Height is 72 inches. Blood pressure is 130/76. Pulse is 57.    Procedures    Results    Assessment / Plan      Assessment/Plan:   Diagnoses and all orders for this visit:    1. Severe Alzheimer's dementia, unspecified timing of dementia onset, unspecified whether behavioral, psychotic, or mood disturbance or anxiety (Primary)    2. Parkinson's disease without dyskinesia, unspecified whether manifestations fluctuate       Assessment & Plan  1. Parkinson's disease.  The patient's Parkinson's disease is characterized by tremors, decreased speech, unsteadiness, and inability to perform activities of daily living such as bathing, walking, and dressing, necessitating significant assistance. The disabilities are considered permanent and totally disabling. The patient's disability is restricting listed activities or functions, decreased cognition, and tremor. The patient's dementia with decreased cognition and Parkinson's interfere with his ability to leave the home anytime, but only with assistance or aides such as canes, braces, crutches, or assistive or other persons required for local motion. The patient's wife  has been instructed to inform me if further assistance is required.        Follow Up:   No follow-ups on file.    Luis Mock  [facility] [place]     Patient or patient representative verbalized consent for the use of Ambient Listening during the visit with  Luis Mock MD for chart documentation. 8/2/2024  18:39 EDT

## 2025-04-11 ENCOUNTER — OFFICE VISIT (OUTPATIENT)
Dept: FAMILY MEDICINE CLINIC | Facility: CLINIC | Age: 75
End: 2025-04-11
Payer: MEDICARE

## 2025-04-11 VITALS
HEART RATE: 60 BPM | HEIGHT: 72 IN | SYSTOLIC BLOOD PRESSURE: 100 MMHG | WEIGHT: 170 LBS | DIASTOLIC BLOOD PRESSURE: 64 MMHG | BODY MASS INDEX: 23.03 KG/M2

## 2025-04-11 DIAGNOSIS — M79.642 LEFT HAND PAIN: Primary | ICD-10-CM

## 2025-04-11 DIAGNOSIS — S62.325A CLOSED DISPLACED FRACTURE OF SHAFT OF FOURTH METACARPAL BONE OF LEFT HAND, INITIAL ENCOUNTER: ICD-10-CM

## 2025-04-11 DIAGNOSIS — M25.532 LEFT WRIST PAIN: ICD-10-CM

## 2025-04-11 PROCEDURE — 1159F MED LIST DOCD IN RCRD: CPT | Performed by: NURSE PRACTITIONER

## 2025-04-11 PROCEDURE — 1160F RVW MEDS BY RX/DR IN RCRD: CPT | Performed by: NURSE PRACTITIONER

## 2025-04-11 NOTE — PROGRESS NOTES
"Chief Complaint  Hand Pain (C/o left hand pain onset Wednesday. )    Subjective          Jackson Sheppard presents to [facility]  History of Present Illness  Pt fell on Wednesday and has complained of left hand pain since then. He has had swelling, bruising, and pain since then.   Hand Pain   Pertinent negatives include no chest pain.       History of Present Illness      Objective   Vital Signs:   /64   Pulse 60   Ht 182.9 cm (72\")   Wt 77.1 kg (170 lb)   BMI 23.06 kg/m²     Body mass index is 23.06 kg/m².    Review of Systems   Constitutional:  Negative for fatigue and fever.   Respiratory:  Negative for shortness of breath.    Cardiovascular:  Negative for chest pain, palpitations and leg swelling.   Musculoskeletal:  Positive for arthralgias.   Neurological:  Negative for syncope.   Psychiatric/Behavioral:  The patient is not nervous/anxious.           Current Outpatient Medications:     busPIRone (BUSPAR) 10 MG tablet, Take 1 tablet by mouth 2 (Two) Times a Day., Disp: , Rfl:     carbidopa-levodopa ER (SINEMET CR)  MG per tablet, Take 2 tablets by mouth 2 (Two) Times a Day., Disp: , Rfl:     donepezil (ARICEPT) 10 MG tablet, , Disp: , Rfl:     hydrocortisone (ANUSOL-HC) 25 MG suppository, Insert 1 suppository into the rectum 2 (Two) Times a Day., Disp: 28 suppository, Rfl: 0    loratadine (CLARITIN) 10 MG tablet, Take 1 tablet by mouth Daily., Disp: , Rfl:     memantine (NAMENDA) 10 MG tablet, 1 tablet., Disp: , Rfl:     meloxicam (MOBIC) 15 MG tablet, Take 1 tablet by mouth Daily. (Patient not taking: Reported on 8/2/2024), Disp: 90 tablet, Rfl: 1      Allergies: Patient has no known allergies.    Physical Exam  Constitutional:       Appearance: Normal appearance.   HENT:      Head: Normocephalic.   Eyes:      Conjunctiva/sclera: Conjunctivae normal.      Pupils: Pupils are equal, round, and reactive to light.   Cardiovascular:      Rate and Rhythm: Normal rate " and regular rhythm.      Heart sounds: Normal heart sounds.   Pulmonary:      Effort: Pulmonary effort is normal.      Breath sounds: Normal breath sounds.   Abdominal:      Tenderness: There is no abdominal tenderness.   Musculoskeletal:         General: Normal range of motion.      Comments: Tenderness/edema/ecchymosis left hand/wrist   Skin:     General: Skin is warm and dry.      Capillary Refill: Capillary refill takes less than 2 seconds.   Neurological:      General: No focal deficit present.      Mental Status: He is alert and oriented to person, place, and time.   Psychiatric:         Mood and Affect: Mood normal.         Behavior: Behavior normal.         Thought Content: Thought content normal.         Judgment: Judgment normal.          Physical Exam         Result Review :                Results            Assessment and Plan    Diagnoses and all orders for this visit:    1. Left hand pain (Primary)  Comments:  XRs done. Apply ice to painful areas and elevate when possible. I will refer to ortho if needed. Tylenol PRN pain.  Orders:  -     XR Hand 3+ View Left; Future    2. Left wrist pain  Comments:  XRs done. Apply ice to painful areas and elevate when possible. I will refer to ortho if needed. Tylenol PRN pain.  Orders:  -     XR Wrist 3+ View Left; Future    3. Closed displaced fracture of shaft of fourth metacarpal bone of left hand, initial encounter  Comments:  F/U with ortho (Dr Cárdenas) today.  Orders:  -     Ambulatory Referral to Orthopedic Surgery                  Follow Up   Return in about 1 week (around 4/18/2025) for if not improving or sooner if symptoms worsen.  Patient was given instructions and counseling regarding his condition or for health maintenance advice. Please see specific information pulled into the AVS if appropriate.     SHERIE Manzano

## 2025-04-14 ENCOUNTER — TELEPHONE (OUTPATIENT)
Dept: FAMILY MEDICINE CLINIC | Facility: CLINIC | Age: 75
End: 2025-04-14
Payer: MEDICARE

## 2025-04-14 NOTE — TELEPHONE ENCOUNTER
I called to check on Mr. Sheppard and see how it went at the ortho doc. I left a vm for them to give us a call back to let us know how he's doing.

## 2025-04-15 ENCOUNTER — TELEPHONE (OUTPATIENT)
Dept: FAMILY MEDICINE CLINIC | Facility: CLINIC | Age: 75
End: 2025-04-15

## 2025-04-15 NOTE — TELEPHONE ENCOUNTER
Provider: MD HAMILTON    Caller: Norma Sheppard    Relationship to Patient: Emergency Contact    Phone Number: [phone]     Reason for Call: PATIENT'S LEFT HAND GOT PUT IN A SPLINT AS THE PROVIDER DECIDED THE BREAK WAS NOT BAD ENOUGH FOR A CAST AND PATIENT WAS TOLD TO FOLLOW UP WITH THE [facility] IN THREE WEEKS